# Patient Record
Sex: FEMALE | Race: BLACK OR AFRICAN AMERICAN | NOT HISPANIC OR LATINO | URBAN - METROPOLITAN AREA
[De-identification: names, ages, dates, MRNs, and addresses within clinical notes are randomized per-mention and may not be internally consistent; named-entity substitution may affect disease eponyms.]

---

## 2023-08-24 DIAGNOSIS — Z36.89 ENCOUNTER FOR FETAL ANATOMIC SURVEY: ICD-10-CM

## 2023-08-24 DIAGNOSIS — I10 HYPERTENSION, UNSPECIFIED TYPE: Primary | ICD-10-CM

## 2023-08-29 DIAGNOSIS — I10 HYPERTENSION, UNSPECIFIED TYPE: Primary | ICD-10-CM

## 2023-09-20 ENCOUNTER — TELEPHONE (OUTPATIENT)
Dept: MATERNAL FETAL MEDICINE | Facility: CLINIC | Age: 28
End: 2023-09-20
Payer: COMMERCIAL

## 2023-09-20 NOTE — TELEPHONE ENCOUNTER
Attempted to call back number. Left vm to call back. Pt has not seen [facility] yet for initial consult. Appt is next week. Advised she call back her primary OBGYN office for clarification.       ----- Message from Nicky Durham MA sent at 9/20/2023  1:24 PM CDT -----  Pt is calling to discuss labs/urine results. Pt states she was told something was high but forgot what exactly it was.         Pt can be reached at [phone].

## 2023-09-25 ENCOUNTER — TELEPHONE (OUTPATIENT)
Dept: MATERNAL FETAL MEDICINE | Facility: CLINIC | Age: 28
End: 2023-09-25
Payer: COMMERCIAL

## 2023-09-25 NOTE — TELEPHONE ENCOUNTER
Pt verified self by name, .   Pt called and states she wants to cancel both of her upcoming apptis with [facility].  RN asked patient what's going on. Pt states she was referred to another obgyn office Dr. Mcginnis.   RN offered pt to call back if patient changes her mind.   Pt verbalized understanding. Agreed to POC w intent to comply.